# Patient Record
Sex: MALE | URBAN - NONMETROPOLITAN AREA
[De-identification: names, ages, dates, MRNs, and addresses within clinical notes are randomized per-mention and may not be internally consistent; named-entity substitution may affect disease eponyms.]

---

## 2018-09-17 ENCOUNTER — TELEPHONE (OUTPATIENT)
Dept: FAMILY MEDICINE CLINIC | Age: 58
End: 2018-09-17

## 2018-09-17 VITALS — DIASTOLIC BLOOD PRESSURE: 95 MMHG | SYSTOLIC BLOOD PRESSURE: 173 MMHG | HEART RATE: 77 BPM

## 2018-09-17 NOTE — TELEPHONE ENCOUNTER
I have never seen this patient. Recommend him make an appt sooner. Would also recommend him signing a records release if he was going elsewhere for his care previously.

## 2018-09-17 NOTE — TELEPHONE ENCOUNTER
Patient came in today for a blood pressure check. The readings are in his chart. I took it in both arms. The third number on each is the average. Patient states he feels fine. No headaches no chest pain. Please call patient with any instructions. His upcoming appointment is at the end of October.

## 2024-08-21 ENCOUNTER — APPOINTMENT (OUTPATIENT)
Dept: PRIMARY CARE | Facility: CLINIC | Age: 64
End: 2024-08-21

## 2024-08-28 ENCOUNTER — APPOINTMENT (OUTPATIENT)
Dept: PRIMARY CARE | Facility: CLINIC | Age: 64
End: 2024-08-28
Payer: COMMERCIAL

## 2024-08-28 VITALS
HEIGHT: 71 IN | DIASTOLIC BLOOD PRESSURE: 88 MMHG | HEART RATE: 103 BPM | SYSTOLIC BLOOD PRESSURE: 154 MMHG | WEIGHT: 184 LBS | BODY MASS INDEX: 25.76 KG/M2 | TEMPERATURE: 98 F

## 2024-08-28 DIAGNOSIS — Z12.5 ENCOUNTER FOR SCREENING FOR MALIGNANT NEOPLASM OF PROSTATE: ICD-10-CM

## 2024-08-28 DIAGNOSIS — J41.0 SIMPLE CHRONIC BRONCHITIS (MULTI): ICD-10-CM

## 2024-08-28 DIAGNOSIS — I96 TOE GANGRENE (MULTI): ICD-10-CM

## 2024-08-28 DIAGNOSIS — Z12.11 ENCOUNTER FOR SCREENING FOR MALIGNANT NEOPLASM OF COLON: ICD-10-CM

## 2024-08-28 DIAGNOSIS — I25.118 ATHEROSCLEROSIS OF CORONARY ARTERY WITH OTHER FORM OF ANGINA PECTORIS, UNSPECIFIED VESSEL OR LESION TYPE, UNSPECIFIED WHETHER NATIVE OR TRANSPLANTED HEART (CMS-HCC): ICD-10-CM

## 2024-08-28 DIAGNOSIS — I74.3: ICD-10-CM

## 2024-08-28 DIAGNOSIS — Z00.00 WELL ADULT EXAM: Primary | ICD-10-CM

## 2024-08-28 PROCEDURE — 99396 PREV VISIT EST AGE 40-64: CPT | Performed by: FAMILY MEDICINE

## 2024-08-28 PROCEDURE — 3008F BODY MASS INDEX DOCD: CPT | Performed by: FAMILY MEDICINE

## 2024-08-28 PROCEDURE — 1036F TOBACCO NON-USER: CPT | Performed by: FAMILY MEDICINE

## 2024-08-28 RX ORDER — NITROGLYCERIN 0.4 MG/1
TABLET SUBLINGUAL EVERY 5 MIN PRN
COMMUNITY
Start: 2022-03-01 | End: 2024-08-28 | Stop reason: WASHOUT

## 2024-08-28 RX ORDER — ATORVASTATIN CALCIUM 40 MG/1
1 TABLET, FILM COATED ORAL NIGHTLY
COMMUNITY
End: 2024-08-28 | Stop reason: WASHOUT

## 2024-08-28 RX ORDER — CLOPIDOGREL BISULFATE 75 MG/1
TABLET ORAL DAILY
COMMUNITY
Start: 2021-03-08 | End: 2024-08-28 | Stop reason: WASHOUT

## 2024-08-28 RX ORDER — ALBUTEROL SULFATE 90 UG/1
INHALANT RESPIRATORY (INHALATION)
COMMUNITY
End: 2024-08-28 | Stop reason: WASHOUT

## 2024-08-28 RX ORDER — METOPROLOL SUCCINATE 25 MG/1
1 TABLET, EXTENDED RELEASE ORAL DAILY
COMMUNITY
Start: 2022-03-01 | End: 2024-08-28 | Stop reason: WASHOUT

## 2024-08-28 ASSESSMENT — PATIENT HEALTH QUESTIONNAIRE - PHQ9
SUM OF ALL RESPONSES TO PHQ9 QUESTIONS 1 AND 2: 0
1. LITTLE INTEREST OR PLEASURE IN DOING THINGS: NOT AT ALL
2. FEELING DOWN, DEPRESSED OR HOPELESS: NOT AT ALL

## 2024-08-28 NOTE — PROGRESS NOTES
Patient is here for annual wellness exam.  Has not been seen in over 2-1/2 years.  He stopped all his medications and does have a heart stent.  He has maintained about the same weight but he is concerned about taking medications overall.  He did see his cardiologist on dating but did not really like him.  He would like a different referral.    REVIEW OF SYSTEMS: 12 systems negative except for those mentioned in the HPI. Reviewed home risks with patient. Patient feels safe at home.    PHYSICAL EXAMINATION:   Constitutional: The patient is alert and oriented x3, in no acute  distress.  Eyes: Extraocular movements are intact. Pupils are equal and reactive to light  ENT: Bilateral TMs within normal limits. Nasal turbinates are within normal limits. Throat within normal limits.  Neck: Supple without lymphadenopathy or JVD.  Heart: Regular rate and rhythm without murmur, click, gallop, or rub.  Lungs: Clear to auscultation bilaterally. No rales, rhonchi, or  wheezing.  Abdomen: Soft, nontender, nondistended. Normoactive bowel sounds.   No palpable masses. Normal percussion  Musculoskeletal: 5/5 motor, upper and lower extremities.  Neurologic: Cranial nerves II through XII fully intact. +2/4 DTRs  in ankle and knee.  Psychiatric: Good judgment and insight. Normal affect and mood. No cognitive defects noted.  Lymphatic: Negative as noted above.  Skin: no rashes or lesions    Assessment:  Per EMR    Plan:  Long open honest discussion with the patient that he has a heart stent and should be on cholesterol medication regardless of what his blood work panel states.  He did not like the metoprolol because it caused erectile dysfunction and discussed his blood pressure was still even on repeat high here today and would recommend at least losartan which would also protect his kidney and also help long-term heart rehabilitation.  Patient declines discussed that he could lose 10% of his weight and calculated out medical weight loss  for him to be able to do that and potentially lower his blood pressure through further 18 pound weight loss.  With go ahead and get annual blood work and also schedule with cardiology to have their opinion as well  Discussed with the patient and reviewed annual wellness questionnaire form as well as cognitive deficits. Also discussed with the patient immunizations and risks for colonoscopy and other screening procedures    This dictation was created using Dragon dictation and may contain errors

## 2024-09-16 ENCOUNTER — TELEPHONE (OUTPATIENT)
Dept: GASTROENTEROLOGY | Facility: EXTERNAL LOCATION | Age: 64
End: 2024-09-16
Payer: COMMERCIAL